# Patient Record
Sex: MALE | Race: OTHER | HISPANIC OR LATINO | URBAN - METROPOLITAN AREA
[De-identification: names, ages, dates, MRNs, and addresses within clinical notes are randomized per-mention and may not be internally consistent; named-entity substitution may affect disease eponyms.]

---

## 2024-06-26 ENCOUNTER — EMERGENCY (EMERGENCY)
Facility: HOSPITAL | Age: 49
LOS: 0 days | Discharge: HOME | End: 2024-06-26
Attending: EMERGENCY MEDICINE
Payer: MEDICAID

## 2024-06-26 VITALS
DIASTOLIC BLOOD PRESSURE: 91 MMHG | OXYGEN SATURATION: 98 % | HEART RATE: 118 BPM | RESPIRATION RATE: 18 BRPM | WEIGHT: 221.34 LBS | SYSTOLIC BLOOD PRESSURE: 126 MMHG | TEMPERATURE: 99 F

## 2024-06-26 VITALS — HEART RATE: 99 BPM

## 2024-06-26 DIAGNOSIS — K58.9 IRRITABLE BOWEL SYNDROME WITHOUT DIARRHEA: ICD-10-CM

## 2024-06-26 DIAGNOSIS — K64.9 UNSPECIFIED HEMORRHOIDS: ICD-10-CM

## 2024-06-26 DIAGNOSIS — K62.5 HEMORRHAGE OF ANUS AND RECTUM: ICD-10-CM

## 2024-06-26 DIAGNOSIS — Z90.49 ACQUIRED ABSENCE OF OTHER SPECIFIED PARTS OF DIGESTIVE TRACT: ICD-10-CM

## 2024-06-26 PROCEDURE — 99283 EMERGENCY DEPT VISIT LOW MDM: CPT

## 2024-06-26 PROCEDURE — 99282 EMERGENCY DEPT VISIT SF MDM: CPT

## 2024-06-26 RX ORDER — HYDROCORTISONE 1 %
1 OINTMENT (GRAM) TOPICAL
Qty: 1 | Refills: 0
Start: 2024-06-26 | End: 2024-07-02

## 2024-06-26 NOTE — ED STATDOCS - PROGRESS NOTE DETAILS
48-year-old male with a past medical history of IBS, status post cholecystectomy back in secondary presents with rectal pain and rectal bleeding for approximately 2 weeks patient states had episode of constipation   prior to symptoms starting.  On examination patient with hemorrhoid at 11:00 which was tender to palpation and had a small abrasion overlying the hemorrhoid.  Hemorrhoid was not thrombosed.  Will discharge home with stool center to follow-up with an Anusol cream the patient can apply at home.  Informed patient that he should be also using sitz bath.  Patient is aware and agrees with plan. -Reymundo Childress PA-C

## 2024-06-26 NOTE — ED STATDOCS - NSFOLLOWUPCLINICS_GEN_ALL_ED_FT
[facility] [id number] [place] [address]  Phone: [phone]  Fax:

## 2024-06-26 NOTE — ED STATDOCS - PATIENT PORTAL LINK FT
You can access the FollowMyHealth Patient Portal offered by [facility] by registering at the following website: [email or web address]. By joining [facility]’s FollowMyHealth portal, you will also be able to view your health information using other applications (apps) compatible with our system.

## 2024-06-26 NOTE — ED STATDOCS - OBJECTIVE STATEMENT
48-year-old male without significant past medical history presents with pain on defecation and rectal bleeding.  Patient states he came from [facility] 2 weeks ago and was constipated after the flight, patient thinks he strained to have a bowel movement about 1 week ago and since then has noticed pain with defecation for 1 week.  Patient noticed in the last day blood on the toilet tissue every time he wipes.

## 2024-06-26 NOTE — ED ADULT TRIAGE NOTE - CHIEF COMPLAINT QUOTE
Patient ambulatory to the ER c/o rectal bleeding starting yesterday. Patient reports pushing hard while having a BM 2 days ago and feeling rectal pain, soreness, and burning. Beginning yesterday, the patient began noticing blood when wiping. Today patient had 2 episodes of bloody stool that was visible in the toilet. Denies hx of hemorrhoids, lightheadedness. PMH of IBS

## 2024-06-26 NOTE — ED STATDOCS - NS ED ATTENDING STATEMENT MOD
Attending Only This was a shared visit with the FREDRICK. I reviewed and verified the documentation.

## 2024-06-26 NOTE — ED STATDOCS - NSFOLLOWUPINSTRUCTIONS_ED_ALL_ED_FT
Hemorroides  Hemorrhoids  The colon, with 3 close-ups of the rectum. One is normal and the other 2 show external and internal hemorrhoids.  Las hemorroides son venas hinchadas en el recto o la lópez que lo rodea, o en la abertura de las nalgas (ano). Hay dos tipos de hemorroides:  Internas. Se kamran en las venas del interior del recto. Pueden abultarse hacia afuera, irritarse y doler.  Externas. Estas se producen en las venas que están fuera del ano. Pueden sentirse gigi meghana hinchazón o un bulto chaitanya dolorosos cerca del ano.  La mayoría de las hemorroides no causan problemas graves. A menudo, pueden tratarse en casa con cambios en la dieta y el estilo de trenton. Si los tratamientos caseros no ayudan, quizás necesite un procedimiento para reducir o extirpar las hemorroides.    ¿Cuáles son las causas?  Las hemorroides son causadas por la presión cerca del ano. Esta presión puede deberse a lo siguiente:  Estreñimiento o diarrea.  Hace esfuerzo para eliminar heces.  Embarazo.  Obesidad.  Estar sentado o andar en bicicleta florentin mucho tiempo.  Levantar objetos pesados u otras cosas que impliquen esfuerzo.  Sexo anal.  ¿Cuáles son los signos o síntomas?  Los síntomas de esta afección incluyen:  Dolor.  Picazón o irritación anal.  Sangrado que proviene del recto.  Fuga de materia fecal (heces).  Hinchazón del ano.  Alexi o más bultos alrededor del ano.  ¿Cómo se diagnostica?  Las hemorroides se diagnostican frecuentemente a través de un examen visual. Posiblemente le realicen otros tipos de pruebas o estudios, gigi los siguientes:  Examen rectal digital. Teller lo realiza el médico mediante tacto rectal con un dedo enguantado.  Anoscopia. Kathy es un examen del ano que se hace con un tubo pequeño.  Análisis de shaggy si ha perdido mucha shaggy.  Sigmoidoscopía o colonoscopía. Estos son estudios para observar el interior del colon a través de un tubo que tiene meghana cámara en el extremo.  ¿Cómo se trata?  En la mayoría de los casos, las hemorroides se pueden tratar en casa con cambios en la dieta y el estilo de trenton. Si estos cambios no resultan eficaces, kristian vez deba someterse a un procedimiento. Estos procedimientos pueden reducir las hemorroides o extirparlas completamente. Algunos de los procedimientos más frecuentes son los siguientes:  Ligadura con chandler elástica. Las bandas elásticas se colocan en la base de las hemorroides para interrumpir velez irrigación de shaggy.  Escleroterapia. Se pone un medicamento dentro de las hemorroides para reducir velez tamaño.  Coagulación con josiah infrarroja. Se utiliza un tipo de energía lumínica para eliminar las hemorroides.  Hemorroidectomía. Las hemorroides se extirpan florentin la cirugía. Luego, las venas que las irrigan se atan para cerrarlas.  Hemorroidopexia con grapas. La base de las hemorroides se engrapa a la pared del recto.  Siga estas instrucciones en velez casa:  Medicamentos    Use los medicamentos de venta adilia y los recetados solamente gigi se lo haya indicado el médico.  Use cremas medicadas o medicamentos medicinales que se ponen en el recto (supositorios) gigi se lo haya indicado el médico.  Comida y bebida    Examples of vegetables and other plant-based foods.  Consumir alimentos ricos en fibra, gigi frijoles, cereales integrales, y frutas y verduras frescas.  Pregúntele a velez médico acerca de hollie productos con fibra añadida (suplementos de fibra).  Disminuya la cantidad de grasa de la dieta. Teller se puede lograr consumiendo productos lácteos con bajo contenido de grasas, ingiriendo yesenia cantidad de baldemar donnelly y evitando los alimentos procesados.  Beber suficiente líquido para mantener el pis (orina) de color amarillo pálido.  Control del dolor y la hinchazón    A bathtub partially filled with water.  Elrosa sánchez de asiento tibios florentin 20 minutos, 3 a 4 veces por día. Teller puede ayudar a reducir el dolor y el malestar. Puede hacer esto en meghana bañera o usar un dispositivo portátil para baño de asiento que se coloca sobre el inodoro.  Si se lo indican, aplique hielo en la lópez afectada. El uso de compresas de hielo entre sánchez de asiento puede ser de ayuda.  Ponga el hielo en meghana bolsa plástica.  Coloque meghana toalla entre la piel y la bolsa.  Aplique el hielo florentin 20 minutos, 2 a 3 veces por día.  Si la piel se le pone de color freeman brillante, retire el hielo de inmediato para evitar daños en la piel. El riesgo de daño es mayor si no puede sentir dolor, calor o frío.  Instrucciones generales    Actividad física. Consulte al médico qué cantidad y qué tipo de ejercicio es mejor para usted. En general, debe realizar al menos 30 minutos de ejercicio moderado la mayoría de los días de la semana (150 minutos cada semana). Es recomendable que pruebe con caminar, andar en bicicleta o practicar yoga.  Vaya al baño cuando sienta ganas de defecar. No espere.  Evite hacer esfuerzos para defecar.  Mantenga el ano limpio y seco. Use papel higiénico húmedo o toallitas humedecidas después de defecar.  No pase mucho tiempo sentado en el inodoro. Teller puede aumentar la afluencia de shaggy y el dolor.  Dónde buscar más información  [employer] [place] of Diabetes and Digestive and Kidney [employer] ([employer] de la Diabetes y las Enfermedades Digestivas y Renales): niddk.nih.gov  Comuníquese con un médico si:  Tiene más dolor e hinchazón que no mejoran con el tratamiento.  Tiene dificultad para defecar o no puede hacerlo.  Siente dolor o tiene inflamación fuera de la lópez de las hemorroides.  Solicite ayuda de inmediato si:  Tiene sangrado del recto y no puede detenerlo.